# Patient Record
Sex: MALE | Race: WHITE
[De-identification: names, ages, dates, MRNs, and addresses within clinical notes are randomized per-mention and may not be internally consistent; named-entity substitution may affect disease eponyms.]

---

## 2018-11-27 ENCOUNTER — HOSPITAL ENCOUNTER (EMERGENCY)
Dept: HOSPITAL 38 - CC.ED | Age: 63
Discharge: NURSING, REHAB OR LONG-TERM CARE | End: 2018-11-27
Payer: COMMERCIAL

## 2018-11-27 DIAGNOSIS — E11.9: ICD-10-CM

## 2018-11-27 DIAGNOSIS — G93.9: Primary | ICD-10-CM

## 2018-11-27 DIAGNOSIS — Z79.4: ICD-10-CM

## 2018-11-27 DIAGNOSIS — I10: ICD-10-CM

## 2018-11-27 DIAGNOSIS — F17.210: ICD-10-CM

## 2018-11-27 DIAGNOSIS — Z79.82: ICD-10-CM

## 2018-11-27 DIAGNOSIS — I25.10: ICD-10-CM

## 2018-11-27 LAB
CHLORIDE SERPL-SCNC: 99 MEQ/L (ref 98–106)
SODIUM SERPL-SCNC: 136 MEQ/L (ref 136–145)

## 2018-11-27 PROCEDURE — 93005 ELECTROCARDIOGRAM TRACING: CPT

## 2018-11-27 PROCEDURE — 36415 COLL VENOUS BLD VENIPUNCTURE: CPT

## 2018-11-27 PROCEDURE — 81001 URINALYSIS AUTO W/SCOPE: CPT

## 2018-11-27 PROCEDURE — 82550 ASSAY OF CK (CPK): CPT

## 2018-11-27 PROCEDURE — 85025 COMPLETE CBC W/AUTO DIFF WBC: CPT

## 2018-11-27 PROCEDURE — 96374 THER/PROPH/DIAG INJ IV PUSH: CPT

## 2018-11-27 PROCEDURE — 70450 CT HEAD/BRAIN W/O DYE: CPT

## 2018-11-27 PROCEDURE — 85730 THROMBOPLASTIN TIME PARTIAL: CPT

## 2018-11-27 PROCEDURE — 84484 ASSAY OF TROPONIN QUANT: CPT

## 2018-11-27 PROCEDURE — 99285 EMERGENCY DEPT VISIT HI MDM: CPT

## 2018-11-27 PROCEDURE — 85610 PROTHROMBIN TIME: CPT

## 2018-11-27 PROCEDURE — 80048 BASIC METABOLIC PNL TOTAL CA: CPT

## 2018-11-27 NOTE — EDM.PDOC
ED HPI GENERAL MEDICAL PROBLEM





- General


Chief Complaint: General


Stated Complaint: DIZZY/HEADACHES/STIFF NECK


Time Seen by Provider: 11/27/18 16:55


Source of Information: Reports: Patient


History Limitations: Reports: No Limitations





- History of Present Illness


INITIAL COMMENTS - FREE TEXT/NARRATIVE: 


Sky is a 64 yo gentleman who presents ambulatory to ED, accompanied by his 

wife, with reports of feeling "unsteady." He states he has felt unsteady for 

the past week or so becoming significantly worse on Sunday. His wife states 

that she had the "flu" and was unsteady for a couple days so they figured that 

is what happened to him as well. He states he doesn't feel as if the room is 

spinning, more so, just unsteady. He has a difficult time keeping his balance 

for about 50-75ft and then it seems to improve. Denies any fall or injury at 

onset. However, has fallen several times in the past week but denies hitting 

his head. He was having headaches as well that were very mild and found if he 

took ibuprofen it seemed to help. He states the headaches have subsided 

completely now. He denies any vision changes. Denies any chest pain, nausea or 

vertigo. He started using a cane, which he has never used before to help with 

being able to walk. yesterday his wife said he will stumble to the side and he 

can't keep himself up. Denies having any pain at this time. Wife states she is 

worried that he had a stroke with all his other medical issues. 





He has history of diabetes, prostrate cancer and aortic valve replacement. 

States he underwent a prostectomy roughly 10 years ago and is followed by 

urologist at [facility] in [place]. States he doctors at [facility] for all his 

medical care. 





- Related Data


 Allergies











Allergy/AdvReac Type Severity Reaction Status Date / Time


 


No Known Allergies Allergy   Verified 11/27/18 16:28











Home Meds: 


 Home Meds





Enalapril [Vasotec] 10 mg PO DAILY 03/10/14 [History]


Insulin Aspart [Novolog Flexpen] 1 - 10 unit SQ TIDAC 03/10/14 [History]


Insulin Glarg,Human.Rec.Analog [Lantus Solostar] 30 units SUBCUT BEDTIME 03/10/

14 [History]


atorvaSTATin [Lipitor] 40 mg PO DAILY 03/10/14 [History]


Aspirin [Ecotrin] 325 mg PO DAILY 11/27/18 [History]


Carvedilol 12.5 mg PO BID 11/27/18 [History]











Past Medical History


Cardiovascular History: Reports: CAD, Heart Valve Replacement, High Cholesterol

, Hypertension


Musculoskeletal History: Reports: Arthritis, Back Pain, Chronic


Psychiatric History: Reports: Other (See Below)


Other Psychiatric History: claustrophobic


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


Oncologic (Cancer) History: Reports: Prostate





- Past Surgical History


HEENT Surgical History: Reports: LASIK


Cardiovascular Surgical History: Reports: Valve Replacement


Male  Surgical History: Reports: Prostatectomy





Social & Family History





- Tobacco Use


Smoking Status *Q: Current Every Day Smoker


Years of Tobacco use: 40


Packs/Tins Daily: 1





- Caffeine Use


Caffeine Use: Reports: Coffee





- Recreational Drug Use


Recreational Drug Use: No





- Living Situation & Occupation


Living situation: Reports: , with Spouse


Occupation: Employed





ED ROS GENERAL





- Review of Systems


Review Of Systems: See Below


Constitutional: Denies: Fever, Chills, Weakness, Decreased Appetite


HEENT: Reports: No Symptoms.  Denies: Hearing Loss, Vision Change


Respiratory: Reports: Cough (dry, chronic smoker).  Denies: Shortness of Breath

, Wheezing, Pleuritic Chest Pain


Cardiovascular: Denies: Chest Pain, Blood Pressure Problem, Palpitations, 

Syncope


GI/Abdominal: Reports: No Symptoms


: Reports: No Symptoms


Musculoskeletal: Reports: No Symptoms


Skin: Reports: No Symptoms


Neurological: Reports: Headache, Difficulty Walking, Gait Disturbance.  Denies: 

Confusion, Numbness, Paresthesia, Seizure, Syncope, Tremors, Trouble Speaking, 

Weakness, Change in Speech


Psychiatric: Reports: No Symptoms





ED EXAM, GENERAL





- Physical Exam


Exam: See Below


Exam Limited By: No Limitations


General Appearance: Alert, No Apparent Distress, Other (Sky is sitting 

comfortably on examination cart having normal conversation with me. )


Eye Exam: Bilateral Eye: EOMI, Normal Inspection, PERRL


Ears: Normal External Exam, Normal Canal, Hearing Grossly Normal, Normal TMs


Nose: Normal Inspection, Normal Mucosa, No Blood


Throat/Mouth: Normal Inspection, Normal Lips, Normal Teeth, Normal Gums, Normal 

Oropharynx, Normal Voice, No Airway Compromise


Head: Atraumatic, Normocephalic


Neck: Normal Inspection, Supple.  No: Lymphadenopathy (L), Lymphadenopathy (R)


Respiratory/Chest: No Respiratory Distress, Lungs Clear, Normal Breath Sounds, 

No Accessory Muscle Use


Cardiovascular: Regular Rate, Rhythm, Systolic Murmur


GI/Abdominal: Normal Bowel Sounds, Soft, Non-Tender, No Organomegaly, No 

Distention, No Abnormal Bruit


Extremities: Normal Inspection


Neurological: Alert, Oriented, CN II-XII Intact, Normal Cognition, No Motor/

Sensory Deficits, Abnormal Gait.  No: Slow to Respond, Memory Loss Remote Events


Psychiatric: Normal Affect, Normal Mood


Skin Exam: Warm, Dry, Intact, Normal Color, No Rash





EKG INTERPRETATION


EKG Date: 11/27/18


Rhythm: NSR


QRS: LBBB


ST-T: Normal


Comparison: NA - No Prior EKG





Course





- Vital Signs


Last Recorded V/S: 





 Last Vital Signs











Temp  97.2 F   11/27/18 16:20


 


Pulse  73   11/27/18 16:20


 


Resp  16   11/27/18 16:20


 


BP  155/87 H  11/27/18 16:20


 


Pulse Ox  97   11/27/18 16:20














- Orders/Labs/Meds


Orders: 





 Active Orders 24 hr











 Category Date Time Status


 


 Head wo Cont [CT] Stat Exams  11/27/18 16:36 Taken











Labs: 





 Laboratory Tests











  11/27/18 11/27/18 11/27/18 Range/Units





  16:45 16:45 16:45 


 


WBC   16.2 H   (5.0-10.0)  10^3/uL


 


RBC   4.76   (4.50-6.00)  10^6/uL


 


Hgb   15.4   (14.0-18.0)  g/dL


 


Hct   44.1   (40.0-54.0)  %


 


MCV   92.6   (82.0-94.0)  fL


 


MCH   32.4 H   (27.0-32.0)  pg


 


MCHC   34.9   (33.0-38.0)  g/dL


 


RDW Coeff of Asiya   13.7   (11.0-15.0)  %


 


Plt Count   195   (150-400)  10^3/uL


 


Neut % (Auto)   76.1   (35-85)  %


 


Lymph % (Auto)   9.3 L   (10-55)  %


 


Mono % (Auto)   11.6   (0-16)  %


 


Eos % (Auto)   2.6   (0-5)  %


 


Baso % (Auto)   0.4   (0-3)  %


 


Neut # (Auto)   12.29 H   (1.80-7.00)  10^3/uL


 


Lymph # (Auto)   1.50   (1.00-4.80)  10^3/uL


 


Mono # (Auto)   1.88 H   (0.00-0.80)  10^3/uL


 


Eos # (Auto)   0.42   (0.00-0.45)  10^3/uL


 


Baso # (Auto)   0.07   10^3/uL


 


PT    10.3  (9.7-12.3)  SEC


 


INR    0.99  (0.92-1.18)  


 


APTT    30.2  (23.2-32.3)  SEC


 


Sodium     (136-145)  mEq/L


 


Potassium     (3.5-5.0)  mEq/L


 


Chloride     ()  mEq/L


 


Carbon Dioxide     (21-32)  mmol/L


 


BUN     (7-18)  mg/dL


 


Creatinine     (0.7-1.3)  mg/dL


 


Est Cr Clr Drug Dosing     mL/min


 


Estimated GFR (MDRD)     (>=60)  mL/min


 


Glucose     (75-99)  mg/dL


 


Calcium     (8.4-10.1)  mg/dL


 


Creatine Kinase     ()  U/L


 


Troponin I     (0.00-0.06)  ng/mL


 


Urine Color  Yellow    (YELLOW)  


 


Urine Appearance  Clear    (CLEAR)  


 


Urine pH  5.5    (4.5-8.0)  


 


Ur Specific Gravity  1.025 H    (1.003-1.020)  


 


Urine Protein  Negative    (NEGATIVE)  mg/dL


 


Urine Glucose (UA)  500 H    (NEGATIVE)  mg/dL


 


Urine Ketones  40 H    (NEGATIVE)  mg/dL


 


Urine Occult Blood  Negative    (NEGATIVE)  


 


Urine Nitrite  Negative    (NEGATIVE)  


 


Urine Bilirubin  Negative    (NEGATIVE)  


 


Urine Urobilinogen  0.2    (0.2-1.0)  EU/dL


 


Ur Leukocyte Esterase  Negative    (NEGATIVE)  


 


Urine RBC  Not seen    (0-5)  /HPF


 


Urine WBC  0-5    (0-5)  /HPF


 


Ur Squamous Epith Cells  Occasional H    (NOT SEEN)  /HPF


 


Urine Bacteria  Few H    (NOT SEEN)  /HPF


 


Urine Mucus  Occasional H    (NOT SEEN)  /HPF














  11/27/18 Range/Units





  16:45 


 


WBC   (5.0-10.0)  10^3/uL


 


RBC   (4.50-6.00)  10^6/uL


 


Hgb   (14.0-18.0)  g/dL


 


Hct   (40.0-54.0)  %


 


MCV   (82.0-94.0)  fL


 


MCH   (27.0-32.0)  pg


 


MCHC   (33.0-38.0)  g/dL


 


RDW Coeff of Asiya   (11.0-15.0)  %


 


Plt Count   (150-400)  10^3/uL


 


Neut % (Auto)   (35-85)  %


 


Lymph % (Auto)   (10-55)  %


 


Mono % (Auto)   (0-16)  %


 


Eos % (Auto)   (0-5)  %


 


Baso % (Auto)   (0-3)  %


 


Neut # (Auto)   (1.80-7.00)  10^3/uL


 


Lymph # (Auto)   (1.00-4.80)  10^3/uL


 


Mono # (Auto)   (0.00-0.80)  10^3/uL


 


Eos # (Auto)   (0.00-0.45)  10^3/uL


 


Baso # (Auto)   10^3/uL


 


PT   (9.7-12.3)  SEC


 


INR   (0.92-1.18)  


 


APTT   (23.2-32.3)  SEC


 


Sodium  136  (136-145)  mEq/L


 


Potassium  4.5  (3.5-5.0)  mEq/L


 


Chloride  99  ()  mEq/L


 


Carbon Dioxide  29  (21-32)  mmol/L


 


BUN  15  (7-18)  mg/dL


 


Creatinine  0.8  (0.7-1.3)  mg/dL


 


Est Cr Clr Drug Dosing  105.08  mL/min


 


Estimated GFR (MDRD)  > 60  (>=60)  mL/min


 


Glucose  271 H D  (75-99)  mg/dL


 


Calcium  8.8  (8.4-10.1)  mg/dL


 


Creatine Kinase  135  ()  U/L


 


Troponin I  0.036  (0.00-0.06)  ng/mL


 


Urine Color   (YELLOW)  


 


Urine Appearance   (CLEAR)  


 


Urine pH   (4.5-8.0)  


 


Ur Specific Gravity   (1.003-1.020)  


 


Urine Protein   (NEGATIVE)  mg/dL


 


Urine Glucose (UA)   (NEGATIVE)  mg/dL


 


Urine Ketones   (NEGATIVE)  mg/dL


 


Urine Occult Blood   (NEGATIVE)  


 


Urine Nitrite   (NEGATIVE)  


 


Urine Bilirubin   (NEGATIVE)  


 


Urine Urobilinogen   (0.2-1.0)  EU/dL


 


Ur Leukocyte Esterase   (NEGATIVE)  


 


Urine RBC   (0-5)  /HPF


 


Urine WBC   (0-5)  /HPF


 


Ur Squamous Epith Cells   (NOT SEEN)  /HPF


 


Urine Bacteria   (NOT SEEN)  /HPF


 


Urine Mucus   (NOT SEEN)  /HPF














- Re-Assessments/Exams


Free Text/Narrative Re-Assessment/Exam: 


Hermilo has been stable the entire time int he local ED. He hasn't had seizures, 

headaches, altered mental status. He has been sitting in the chair beside his 

wife in normal spirits. GCS upon arrival was 15 and continues to still be 15. 





Departure





- Departure


Time of Disposition: 18:45


Disposition: DC/Tfer to Acute Hospital [facility]


Clinical Impression: 


 Lesion of parietal lobe of brain








- Discharge Information


Referrals: 


PCP,None [Primary Care Provider] - 





- Problem List & Annotations


(1) Lesion of parietal lobe of brain


SNOMED Code(s): 739595466


   Code(s): G93.9 - DISORDER OF BRAIN, UNSPECIFIED   Status: Acute   Current 

Visit: Yes   





- My Orders


Last 24 Hours: 





My Active Orders





11/27/18 16:36


Head wo Cont [CT] Stat 














- Assessment/Plan


Last 24 Hours: 





My Active Orders





11/27/18 16:36


Head wo Cont [CT] Stat 











Plan: 


Consulted with Dr. Suarez, Neurosurgeon, at [facility] in [place]. Dr. Suarez 

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 

parietal region and along the cerebellum. Advised giving 10mg of Decadron 

intravenously. He recommended consulting with hospitalist for admission for 

further evaluation with MRI and oncology consultation. Dr. Antonio was 

consulted as well, hospitalist, who kindly accepted transfer. We will give the 

10mg of Decadron and will monitor blood sugars en route. Last blood sugar was 

281 done at 1847. 





Risks and benefits discussed with Hermilo and his wife in regards to transfer. 

Risks of transfer included worsening of condition. MVA, seizures or death. 

Benefits of transfer included specialty care and interventions not provided at 

this local facility. Risks of non-transfer included lack of specialized 

treatment/interventions, worsening of condition, death. Benefits of non-

transfer included staying in familiar environment and close to home. Both Sean 

and his wife verbalized understandng and is in agreement with transfer. Will 

transfer via [facility].